# Patient Record
Sex: FEMALE | Race: WHITE
[De-identification: names, ages, dates, MRNs, and addresses within clinical notes are randomized per-mention and may not be internally consistent; named-entity substitution may affect disease eponyms.]

---

## 2017-06-06 ENCOUNTER — HOSPITAL ENCOUNTER (OUTPATIENT)
Dept: HOSPITAL 47 - ORWHC2ENDO | Age: 77
Discharge: HOME | End: 2017-06-06
Payer: COMMERCIAL

## 2017-06-06 VITALS — BODY MASS INDEX: 30 KG/M2

## 2017-06-06 VITALS — RESPIRATION RATE: 18 BRPM | SYSTOLIC BLOOD PRESSURE: 145 MMHG | DIASTOLIC BLOOD PRESSURE: 78 MMHG

## 2017-06-06 VITALS — HEART RATE: 56 BPM

## 2017-06-06 VITALS — TEMPERATURE: 97.1 F

## 2017-06-06 DIAGNOSIS — K29.50: ICD-10-CM

## 2017-06-06 DIAGNOSIS — K20.9: ICD-10-CM

## 2017-06-06 DIAGNOSIS — K44.9: ICD-10-CM

## 2017-06-06 DIAGNOSIS — K22.70: ICD-10-CM

## 2017-06-06 DIAGNOSIS — K59.09: ICD-10-CM

## 2017-06-06 DIAGNOSIS — R19.4: Primary | ICD-10-CM

## 2017-06-06 PROCEDURE — 45378 DIAGNOSTIC COLONOSCOPY: CPT

## 2017-06-06 PROCEDURE — 43239 EGD BIOPSY SINGLE/MULTIPLE: CPT

## 2017-06-06 PROCEDURE — 88342 IMHCHEM/IMCYTCHM 1ST ANTB: CPT

## 2017-06-06 PROCEDURE — 88305 TISSUE EXAM BY PATHOLOGIST: CPT

## 2017-06-06 NOTE — P.PCN
Date of Procedure: 06/06/17


Preoperative Diagnosis: 





Postoperative Diagnosis: 





Procedure(s) Performed: 


Procedures: 1. Esophagogastroduodenoscopy and biopsy.  2. Total colonoscopy.





Preoperative diagnosis: Dyspepsia and change in bowel habits.





Postoperative diagnosis: 1. Hiatal hernia and short segment of Myers's and 

distal esophagitis.  2. Mild antral gastritis. 3. Exam of the colon essentially 

within normal limits.





Preparation: HalfLytely prep.





Sedation: Was provided by anesthesia.





Brief clinical history: The patient is a 77-year-old female who has been having 

difficulties with belching and epigastric distress and pain of recent onset.  

She also has ongoing issues with constipation.  This evaluation was requested 

to assess for neoplasia, complicated reflux disease or other pathology.





Procedure: With the patient on her left lateral decubitus position and after 

informed consent and adequate sedation, I passed the Olympus- video 

upper endoscope through the cricopharyngeus down the esophagus. The distal 

esophagus showed couple short linear erosions consistent with LA grade B distal 

esophagitis and there was a very short segment of Myers's esophagus around 1-

2 cm followed by a sliding hiatal hernia of around 2-3 cm in size.  There were 

no strictures.  The stomach was then insufflated with air and inspected in 

detail including the retroflex view of the cardia.  There was minimal mottling 

and erythema in the antrum but no ulcers or erosions.  Pyloric channel, 

duodenal bulb, post bulbar area and descending duodenum appeared within normal 

limits.  I obtained biopsies from the duodenum, antrum, Myers's segment and 

distal esophagus then the endoscope was withdrawn and I proceeded with the 

colonoscopy.





Perianal area did not show any fissures or fistulas.  There were no masses felt 

on digital rectal examination.  The Olympus CFQ 160L video colonoscope was then 

inserted in the rectum in the usual fashion and advanced to the cecum.  No 

obvious abnormalities were seen.  I retroflexed the endoscope in the rectum 

before the endoscope was withdrawn.





The patient tolerated the procedure well.





Plan: I discussed these findings with the patient and her .  Will await 

biopsy results and make additional recommendations.  In the meantime, she will 

continue dietary measures and other interventions for her chronic constipation.

  I would be happy to see in the office if her symptoms persist.  Consideration 

will be given for a repeat upper endoscopy in around 2 years depending on her 

biopsy results.


Implants: 





Indications for Procedure: 





Operative Findings: 





Description of Procedure:

## 2018-04-23 ENCOUNTER — HOSPITAL ENCOUNTER (OUTPATIENT)
Dept: HOSPITAL 47 - CATHCVL | Age: 78
Discharge: HOME | End: 2018-04-23
Payer: COMMERCIAL

## 2018-04-23 VITALS — HEART RATE: 78 BPM | RESPIRATION RATE: 20 BRPM | TEMPERATURE: 97.6 F

## 2018-04-23 VITALS — DIASTOLIC BLOOD PRESSURE: 82 MMHG | SYSTOLIC BLOOD PRESSURE: 160 MMHG

## 2018-04-23 VITALS — BODY MASS INDEX: 30.2 KG/M2

## 2018-04-23 DIAGNOSIS — Z79.82: ICD-10-CM

## 2018-04-23 DIAGNOSIS — E78.5: ICD-10-CM

## 2018-04-23 DIAGNOSIS — Z79.02: ICD-10-CM

## 2018-04-23 DIAGNOSIS — I25.110: Primary | ICD-10-CM

## 2018-04-23 DIAGNOSIS — Z79.899: ICD-10-CM

## 2018-04-23 DIAGNOSIS — I34.0: ICD-10-CM

## 2018-04-23 DIAGNOSIS — I10: ICD-10-CM

## 2018-04-23 DIAGNOSIS — Z87.891: ICD-10-CM

## 2018-04-23 DIAGNOSIS — E78.00: ICD-10-CM

## 2018-04-23 DIAGNOSIS — I25.84: ICD-10-CM

## 2018-04-23 LAB
ANION GAP SERPL CALC-SCNC: 13 MMOL/L
BASOPHILS # BLD AUTO: 0.1 K/UL (ref 0–0.2)
BASOPHILS NFR BLD AUTO: 1 %
BUN SERPL-SCNC: 16 MG/DL (ref 7–17)
CALCIUM SPEC-MCNC: 9.9 MG/DL (ref 8.4–10.2)
CHLORIDE SERPL-SCNC: 99 MMOL/L (ref 98–107)
CO2 SERPL-SCNC: 27 MMOL/L (ref 22–30)
EOSINOPHIL # BLD AUTO: 0.3 K/UL (ref 0–0.7)
EOSINOPHIL NFR BLD AUTO: 4 %
ERYTHROCYTE [DISTWIDTH] IN BLOOD BY AUTOMATED COUNT: 4.9 M/UL (ref 3.8–5.4)
ERYTHROCYTE [DISTWIDTH] IN BLOOD: 12.7 % (ref 11.5–15.5)
GLUCOSE SERPL-MCNC: 94 MG/DL (ref 74–99)
HCT VFR BLD AUTO: 42.2 % (ref 34–46)
HGB BLD-MCNC: 14.4 GM/DL (ref 11.4–16)
LYMPHOCYTES # SPEC AUTO: 1.9 K/UL (ref 1–4.8)
LYMPHOCYTES NFR SPEC AUTO: 28 %
MCH RBC QN AUTO: 29.5 PG (ref 25–35)
MCHC RBC AUTO-ENTMCNC: 34.2 G/DL (ref 31–37)
MCV RBC AUTO: 86.1 FL (ref 80–100)
MONOCYTES # BLD AUTO: 0.5 K/UL (ref 0–1)
MONOCYTES NFR BLD AUTO: 7 %
NEUTROPHILS # BLD AUTO: 3.9 K/UL (ref 1.3–7.7)
NEUTROPHILS NFR BLD AUTO: 57 %
PLATELET # BLD AUTO: 270 K/UL (ref 150–450)
POTASSIUM SERPL-SCNC: 4.5 MMOL/L (ref 3.5–5.1)
SODIUM SERPL-SCNC: 139 MMOL/L (ref 137–145)
WBC # BLD AUTO: 6.8 K/UL (ref 3.8–10.6)

## 2018-04-23 PROCEDURE — 80048 BASIC METABOLIC PNL TOTAL CA: CPT

## 2018-04-23 PROCEDURE — 85025 COMPLETE CBC W/AUTO DIFF WBC: CPT

## 2018-04-23 PROCEDURE — 93458 L HRT ARTERY/VENTRICLE ANGIO: CPT

## 2018-04-23 RX ADMIN — VERAPAMIL HYDROCHLORIDE ONE ML: 2.5 INJECTION, SOLUTION INTRAVENOUS at 11:39

## 2018-04-23 RX ADMIN — VERAPAMIL HYDROCHLORIDE ONE ML: 2.5 INJECTION, SOLUTION INTRAVENOUS at 11:56

## 2018-04-23 NOTE — CC
CARDIAC CATHETERIZATION REPORT



DATE OF SERVICE:

04/23/2018



PROCEDURE:

Left heart catheterization, coronary angiography.



PERFORMED BY:

Dr. MEDARDO Victor.



CLINICAL INFORMATION:

Mrs. Victoria Cardoza is a lady with a known history of hypertension, hyperlipidemia,

who has noncritical CAD.  She had an abnormal stress test with evidence of a reversible

defect involving the mid anterior wall and symptoms of chest discomfort and therefore

she was advised coronary angiography.  Risks, benefits, options, rationale were

explained to the patient.  She and her  understood all details and wished to

proceed with the procedure.



PROCEDURE NOTE:

Under local anesthesia and strict aseptic precautions, a 6-Yoruba introducer was placed

in the right radial artery.  Using an Ultimate 1 catheter, I performed selective

coronary angiography of the left system.  Using a standard right Devora catheter, I

performed selective coronary angiography of the right system.  A pigtail catheter was

used to check LV pressures.  The LV gram was not performed.  The sheath was taken out

and a TR band applied as per protocol.  Saturation in the fingers of the right hand was

95%.  She was sent to the room in a stable condition.  Results were discussed with the

patient and family.  Continued medical therapy with risk factor modification was

advised.



Moderate conscious sedation time was 23 minutes.  Patient was given a combination of

Versed and Benadryl while on oxygen saturation, hemodynamics and EKG were was monitored

closely.



CORONARY ANGIOGRAPHY FINDINGS:

LEFT MAIN CORONARY ARTERY:  This is a long patent vessel free of significant disease

that bifurcates into LAD and circumflex.



LEFT ANTERIOR DESCENDING CORONARY ARTERY:  This vessel has about a 30% narrowing in the

midportion, gives off a diagonal branch which at its origin has another 35% narrowing

and LAD continues all the way to the apex and diagonal is also a good caliber, supplies

a fair amount of myocardium, has no other significant disease other than ostial 40%

narrowing best seen in the cranial projection.  This stenosis is unchanged compared to

the previous study.



LEFT POSTERIOR CIRCUMFLEX CORONARY ARTERY:  This is a nondominant vessel of good

caliber and distribution, gives off a first obtuse marginal that is tortuous, has no

significant disease and then runs in the AV groove giving off a posterior lateral

branch.  No significant disease is noted in the circumflex system.  The circumflex

marginal is of a good caliber and distribution and there is no more than 20%-30%

narrowing in the proximal portion seen in multiple views.



RIGHT CORONARY ARTERY:  Technically dominant vessel has some calcification in the

proximal portion, in the distally before bifurcation there is a 40%-45% narrowing and

bifurcates into a larger PDA. a smaller PLV, both of which have minor irregularities.

This patient probably has a codominant system and the distal RCA has about a 40%-45%

lesion, but does not have any significant obstructive disease.



LEFT VENTRICULOGRAM:  This was not performed.



FINAL IMPRESSION:

This patient has a 40%-45% distal RCA disease.  The diagonal disease is about 40% as it

comes off from the LAD which is pretty much unchanged or if any, modestly improved.

The circumflex has no significant disease.  Filling pressures are acceptable. The left

ventricular end-diastolic pressure was about 10-12 mmHg without any gradient across

aortic valve.  LV gram was not performed.



RECOMMENDATIONS:

I am recommending continued medical therapy with risk factor modification.  Patient

will be discharged later on today and I will see her in the office on the 27th.  She is

advised risk factor modification and to call if she has a question concern or problem.





MMTHADDEUS / IJN: [id number] / Job#: [id number]

## 2021-12-01 ENCOUNTER — HOSPITAL ENCOUNTER (OUTPATIENT)
Dept: HOSPITAL 47 - RADCTMAIN | Age: 81
Discharge: HOME | End: 2021-12-01
Attending: FAMILY MEDICINE
Payer: COMMERCIAL

## 2021-12-01 DIAGNOSIS — I67.82: Primary | ICD-10-CM

## 2021-12-01 PROCEDURE — 70450 CT HEAD/BRAIN W/O DYE: CPT

## 2021-12-01 NOTE — CT
EXAMINATION TYPE: CT brain wo con

 

DATE OF EXAM: 12/1/2021

 

COMPARISON: None

 

HISTORY: Dizziness, sense of falling to right side

 

CT DLP: 1121 mGycm

 

Unenhanced CT of the brain was performed.  

 

The ventricles, basal cisterns and sulci overlying the cerebral convexities demonstrate mild enlargem
ent. 

 

There is no evidence for intracranial hemorrhage or sulcal effacement.  

 

There is decreased attenuation about the periventricular white matter and deep white matter of both c
erebral hemispheres, compatible with chronic small vessel ischemia. Differential diagnosis does inclu
de demyelination. 

 

No mass effects are seen.No midline shift.

 

Osseous calvarium is intact.  

 

If symptoms persist consider MRI.  

 

IMPRESSION:

 

1. Age related atrophic and chronic small vessel ischemic change without acute intracranial process s
een at this time.

## 2024-09-24 ENCOUNTER — HOSPITAL ENCOUNTER (OUTPATIENT)
Dept: HOSPITAL 47 - LABPAT | Age: 84
Discharge: HOME | End: 2024-09-24
Attending: INTERNAL MEDICINE
Payer: COMMERCIAL

## 2024-09-24 DIAGNOSIS — R94.39: ICD-10-CM

## 2024-09-24 DIAGNOSIS — Z01.812: Primary | ICD-10-CM

## 2024-09-24 LAB
ANION GAP SERPL CALC-SCNC: 9.8 MMOL/L (ref 4–12)
BUN SERPL-SCNC: 25.6 MG/DL (ref 9–27)
CHLORIDE SERPL-SCNC: 99 MMOL/L (ref 96–109)
CO2 SERPL-SCNC: 30.2 MMOL/L (ref 21.6–31.8)
ERYTHROCYTE [DISTWIDTH] IN BLOOD BY AUTOMATED COUNT: 4.26 X 10*6/UL (ref 4.1–5.2)
ERYTHROCYTE [DISTWIDTH] IN BLOOD: 13.8 % (ref 11.5–14.5)
HCT VFR BLD AUTO: 39.3 % (ref 37.2–46.3)
HGB BLD-MCNC: 12.6 G/DL (ref 12–15)
MCH RBC QN AUTO: 29.6 PG (ref 27–32)
MCHC RBC AUTO-ENTMCNC: 32.1 G/DL (ref 32–37)
MCV RBC AUTO: 92.3 FL (ref 80–97)
NRBC BLD AUTO-RTO: 0 X 10*3/UL (ref 0–0.01)
PLATELET # BLD AUTO: 241 X 10*3/UL (ref 140–440)
POTASSIUM SERPL-SCNC: 4.8 MMOL/L (ref 3.5–5.5)
SODIUM SERPL-SCNC: 139 MMOL/L (ref 135–145)
WBC # BLD AUTO: 6.9 X 10*3/UL (ref 4.5–10)

## 2024-09-24 PROCEDURE — 85027 COMPLETE CBC AUTOMATED: CPT

## 2024-09-24 PROCEDURE — 82565 ASSAY OF CREATININE: CPT

## 2024-09-24 PROCEDURE — 84520 ASSAY OF UREA NITROGEN: CPT

## 2024-09-24 PROCEDURE — 36415 COLL VENOUS BLD VENIPUNCTURE: CPT

## 2024-09-24 PROCEDURE — 80051 ELECTROLYTE PANEL: CPT

## 2024-10-15 ENCOUNTER — HOSPITAL ENCOUNTER (OUTPATIENT)
Dept: HOSPITAL 47 - CATHCVL | Age: 84
Discharge: HOME | End: 2024-10-15
Attending: INTERNAL MEDICINE
Payer: COMMERCIAL

## 2024-10-15 VITALS — RESPIRATION RATE: 16 BRPM | TEMPERATURE: 96.9 F

## 2024-10-15 VITALS — SYSTOLIC BLOOD PRESSURE: 118 MMHG | HEART RATE: 54 BPM | DIASTOLIC BLOOD PRESSURE: 58 MMHG

## 2024-10-15 PROCEDURE — 93458 L HRT ARTERY/VENTRICLE ANGIO: CPT

## 2024-10-15 RX ADMIN — CEFAZOLIN ONE MLS: 330 INJECTION, POWDER, FOR SOLUTION INTRAMUSCULAR; INTRAVENOUS at 06:16

## 2024-10-15 RX ADMIN — IOPAMIDOL ONE ML: 755 INJECTION, SOLUTION INTRAVENOUS at 08:04

## 2024-10-15 RX ADMIN — CEFAZOLIN SCH MLS/HR: 330 INJECTION, POWDER, FOR SOLUTION INTRAMUSCULAR; INTRAVENOUS at 06:16

## 2024-10-15 RX ADMIN — ISOSORBIDE MONONITRATE SCH MG: 30 TABLET, EXTENDED RELEASE ORAL at 09:06

## 2024-10-15 RX ADMIN — HEPARIN SODIUM ONE UNIT: 1000 INJECTION, SOLUTION INTRAVENOUS; SUBCUTANEOUS at 07:52

## 2024-10-15 RX ADMIN — ATORVASTATIN CALCIUM STA: 80 TABLET, FILM COATED ORAL at 09:10

## 2024-10-15 RX ADMIN — ASPIRIN 325 MG ORAL TABLET STA: 325 PILL ORAL at 06:16

## 2024-10-15 RX ADMIN — CEFAZOLIN SCH MLS/HR: 330 INJECTION, POWDER, FOR SOLUTION INTRAMUSCULAR; INTRAVENOUS at 08:10

## 2024-10-15 RX ADMIN — MIDAZOLAM ONE MG: 1 INJECTION INTRAMUSCULAR; INTRAVENOUS at 07:34

## 2024-10-15 RX ADMIN — LIDOCAINE HYDROCHLORIDE ONE ML: 10 INJECTION, SOLUTION INFILTRATION; PERINEURAL at 07:38

## 2024-10-15 RX ADMIN — CEFAZOLIN ONE MLS: 330 INJECTION, POWDER, FOR SOLUTION INTRAMUSCULAR; INTRAVENOUS at 07:32

## 2024-10-15 RX ADMIN — VERAPAMIL HYDROCHLORIDE ONE ML: 2.5 INJECTION, SOLUTION INTRAVENOUS at 07:52

## 2024-10-15 RX ADMIN — METHYLENE BLUE ONE MLS: 10 INJECTION INTRAVENOUS at 07:32

## 2024-10-15 NOTE — P.CARDCATH
Date of Procedure: 10/15/24


Description of Procedure: 


History:


Patient was referred for cardiac catheterization to evaluate for CAD.  This 

patient has history of paroxysmal atrial fibrillation, hypertension 

hyperlipidemia and noncritical CAD with a 40 to 50% disease in the diagonal 

branch based on a cardiac cath from 2018.  Lately she has symptoms of shortness 

of breath and chest tightness with a positive stress test with evidence of a and

mid anterior area of hypokinesia with possible ischemia and therefore she was 

advised to cardiac catheterization after due discussion regarding risks benefits

and options.





Procedure Details:


Procedure: Left heart catheterization and coronary angiography right radial 

approach


The risks, benefits, complications, treatment options, and expected outcomes 

were discussed with the patient. The patient and/or family concurred with the 

proposed plan, giving informed consent. Patient was brought to the cath lab 

after IV hydration was begun and oral premedication was given. 


Patient was further sedated with midazolam. 


Patient was prepped and draped in the usual manner. Under strict aseptic 

precautions and local anesthesia a 6 Japanese introducer was placed in the right 

radial artery. Using a JL 3/5 and a JR 4/0 catheters I performed coronary 

angiography and the same JR catheter was used to check LV pressures and LV gram 

was not performed. 


After the procedure was completed the sheaths and catheters were all removed. 

Hemostasis was achieved with TR band. Saturation in the fingers of the right 

hand was about 94%. 





Moderate conscious sedation time was 24 minutes. Patient's oxygen saturation 

hemodynamics and EKG were monitored closely. 











Findings:





Hemodynamics: Left ventricle end-diastolic pressure was about 18 mmHg without 

any gradient across aortic valve.


Left Main: Short patent vessel no significant disease bifurcates into LAD and 

codominant/nondominant circumflex


LAD: Good caliber vessel which has about a 30% narrowing or so in the midportion

gives off a good-sized diagonal branch the origin of which is calcified has of 

about a 40%  narrowing calcified at its origin.  LAD itself has mild diffuse 

disease throughout and distally after it curves over.  The septal branches have 

minor irregularities.  The diagonal after its disease and the origin does not 

have any other significant disease and supplies a fair amount of myocardium the 

apex has diffuse disease in the distal 1/5 of the vessel


CIRC: Technically a nondominant vessel probably with good distribution gives off

a first obtuse marginal that is tortuous and then continues in the AV groove 

with a distal posterior lateral branch there are minor irregularities of no more

than 30 to 35% in the circumflex system.


RCA: Technically dominant vessel mild calcification proximally in the distal one

third there is about 40% narrowing unchanged and bifurcates into a larger PDA 

smaller PLV which have minor irregularities.  Compared to the previous study 

from 2018 no significant change


LV gram was not performed


Closure Device: TR band


Complications: None none





Estimated Blood Loss: Minimal





Impression: This patient has a right dominant system moderate CAD.  LAD itself 

is free of significant disease but the diagonal branch has a 40 to 50% calcified

disease at the ostium fair distribution vessel.  Elevated filling pressures no 

gradient no significant disease and circumflex, RCA dominant has diffuse 40% 

disease unchanged.





Pre Procedure Diagnosis: CAD with abnormal stress test





Final Post Procedure Diagnosis: CAD with noncritical disease





Recommendation: Continued medical therapy and Imdur 30 mg daily resume Eliquis 

2.5 mg twice daily from tomorrow.  Findings reviewed with the patient and family

members.  She will be discharged later on today and I will see her on Friday, October 18.  Discharge instructions given





Complications: None; patient tolerated the procedure well. 


Disposition: Pacu - hemodynamically stable. 


Condition: Stable


Discharge Disposition: Discharge patient home later on today.